# Patient Record
Sex: FEMALE | Race: WHITE | NOT HISPANIC OR LATINO
[De-identification: names, ages, dates, MRNs, and addresses within clinical notes are randomized per-mention and may not be internally consistent; named-entity substitution may affect disease eponyms.]

---

## 2018-07-17 PROBLEM — Z00.00 ENCOUNTER FOR PREVENTIVE HEALTH EXAMINATION: Noted: 2018-07-17

## 2018-07-20 ENCOUNTER — APPOINTMENT (OUTPATIENT)
Dept: PLASTIC SURGERY | Facility: CLINIC | Age: 63
End: 2018-07-20
Payer: SELF-PAY

## 2018-07-20 PROCEDURE — 11950 SUBQ NJX FILLING MATRL 1CC/<: CPT

## 2018-08-21 ENCOUNTER — APPOINTMENT (OUTPATIENT)
Dept: PLASTIC SURGERY | Facility: CLINIC | Age: 63
End: 2018-08-21
Payer: SELF-PAY

## 2018-08-21 PROCEDURE — 11950 SUBQ NJX FILLING MATRL 1CC/<: CPT

## 2019-05-16 PROBLEM — Z00.00 ENCOUNTER FOR PREVENTIVE HEALTH EXAMINATION: Noted: 2019-05-16

## 2019-05-17 ENCOUNTER — APPOINTMENT (OUTPATIENT)
Dept: PLASTIC SURGERY | Facility: CLINIC | Age: 64
End: 2019-05-17
Payer: SELF-PAY

## 2019-05-17 PROCEDURE — 11950 SUBQ NJX FILLING MATRL 1CC/<: CPT

## 2021-03-19 ENCOUNTER — APPOINTMENT (OUTPATIENT)
Age: 66
End: 2021-03-19
Payer: SELF-PAY

## 2021-03-19 PROCEDURE — 11950 SUBQ NJX FILLING MATRL 1CC/<: CPT

## 2021-03-22 PROBLEM — Z00.00 ENCOUNTER FOR PREVENTIVE HEALTH EXAMINATION: Status: ACTIVE | Noted: 2021-03-22

## 2021-04-22 ENCOUNTER — APPOINTMENT (OUTPATIENT)
Dept: PLASTIC SURGERY | Facility: CLINIC | Age: 66
End: 2021-04-22
Payer: SELF-PAY

## 2021-04-22 PROCEDURE — 11950 SUBQ NJX FILLING MATRL 1CC/<: CPT

## 2021-04-26 NOTE — ASSESSMENT
[FreeTextEntry1] : CHRISTIAN RIVAS  was here for touch up revision of prior Botox injection.  She  tolerated the procedure well and will return for next dose in 4-5 months.  Instructions were reviewed.

## 2021-04-26 NOTE — HISTORY OF PRESENT ILLNESS
[FreeTextEntry1] : CHRISTIAN RIVAS is complaining of residual dynamic rhytids of the face and desires more botulinum toxin for temporary reduction in these rhytids.  The risks benefits alternatives limitations and permanent scars were outlined with her . Under aseptic conditions, Botulinum Toxin was administered in the desired area. Please see face sheet for lot , site and dose information. \par \par Please see the scanned face sheet for lot and dose information.\par

## 2021-05-04 NOTE — ASSESSMENT
[FreeTextEntry1] : CHRISTIAN RIVAS  was here for procedural Botox injection.  She  tolerated the procedure well and will return for next dose in 4-5 months.  Instructions were reviewed.

## 2021-05-04 NOTE — PROCEDURE
[FreeTextEntry6] : CHRISTIAN RIVAS is complaining of dynamic rhytids of the face and desires botulinum toxin for temporary reduction in these rhytids.  The risks benefits alternatives limitations and permanent scars were outlined with her . Under aseptic conditions, Botulinum Toxin was administered in the desired area. Please see face sheet for lot , site and dose information. \par \par Please see the scanned face sheet for lot and dose information. Aseptic administration of botox to indicated areas of the face.  See external sheet for lot and dose information

## 2021-06-08 ENCOUNTER — APPOINTMENT (OUTPATIENT)
Dept: PLASTIC SURGERY | Facility: CLINIC | Age: 66
End: 2021-06-08
Payer: SELF-PAY

## 2021-06-08 PROCEDURE — 99212 OFFICE O/P EST SF 10 MIN: CPT

## 2021-06-09 RX ORDER — ALPRAZOLAM 0.25 MG/1
0.25 TABLET ORAL
Qty: 30 | Refills: 0 | Status: ACTIVE | COMMUNITY
Start: 2021-04-29

## 2021-06-09 NOTE — HISTORY OF PRESENT ILLNESS
[FreeTextEntry1] : pt is having refractory rhytids after botox admin and touch up. discussed possible antibody production and discussed alternative treatments if it no longer works  20 u touch up given

## 2021-06-09 NOTE — ASSESSMENT
[FreeTextEntry1] : CHRISTIAN RIVAS  was here for touch up Botox injection.  She  tolerated the procedure well and will return for next dose in 4-5 months.  Instructions were reviewed.\par

## 2021-12-03 ENCOUNTER — APPOINTMENT (OUTPATIENT)
Dept: PLASTIC SURGERY | Facility: CLINIC | Age: 66
End: 2021-12-03
Payer: SELF-PAY

## 2021-12-03 PROCEDURE — 64612 DESTROY NERVE FACE MUSCLE: CPT

## 2022-01-27 ENCOUNTER — APPOINTMENT (OUTPATIENT)
Dept: PLASTIC SURGERY | Facility: CLINIC | Age: 67
End: 2022-01-27
Payer: SELF-PAY

## 2022-01-27 PROCEDURE — 11950 SUBQ NJX FILLING MATRL 1CC/<: CPT

## 2022-01-28 RX ORDER — HYDROCORTISONE 25 MG/G
2.5 CREAM TOPICAL
Qty: 30 | Refills: 0 | Status: ACTIVE | COMMUNITY
Start: 2022-01-12

## 2022-01-28 NOTE — ASSESSMENT
[FreeTextEntry1] : CHRISTIAN RIVAS  was here for procedural voluma injection.  She  tolerated the procedure well and will return for next dose in 8-12 months.  Instructions were reviewed.

## 2022-01-28 NOTE — PROCEDURE
[FreeTextEntry6] : Aseptic administration of voluma to indicated areas of the face.  See external sheet for lot and dose information \par CHRISTIAN RIVAS is complaining of atrophy  of the face and desires voluma for temporary reduction in these rhytids.  The risks benefits alternatives limitations and permanent scars were outlined with her . Under aseptic conditions, voluma  was administered in the desired area. Please see face sheet for lot , site and dose information. \par \par Please see the scanned face sheet for lot and dose information.

## 2023-12-19 ENCOUNTER — APPOINTMENT (OUTPATIENT)
Dept: PLASTIC SURGERY | Facility: CLINIC | Age: 68
End: 2023-12-19
Payer: SELF-PAY

## 2023-12-19 DIAGNOSIS — L98.8 OTHER SPECIFIED DISORDERS OF THE SKIN AND SUBCUTANEOUS TISSUE: ICD-10-CM

## 2023-12-19 PROCEDURE — 64612 DESTROY NERVE FACE MUSCLE: CPT

## 2023-12-19 NOTE — PROCEDURE
[FreeTextEntry6] : CHRISTIAN RIVAS is complaining of dynamic rhytids of the face and desires botulinum toxin for temporary reduction in these rhytids.  The risks benefits alternatives limitations and permanent scars were outlined with her . Under aseptic conditions, Botulinum Toxin was administered in the desired area. Please see face sheet for lot , site and dose information.   Please see the scanned face sheet for lot and dose information. Aseptic administration of botox to indicated areas of the face.  See external sheet for lot and dose information

## 2024-05-24 ENCOUNTER — TRANSCRIPTION ENCOUNTER (OUTPATIENT)
Age: 69
End: 2024-05-24

## 2024-11-08 ENCOUNTER — APPOINTMENT (OUTPATIENT)
Dept: PLASTIC SURGERY | Facility: CLINIC | Age: 69
End: 2024-11-08

## 2024-11-08 PROCEDURE — 64612 DESTROY NERVE FACE MUSCLE: CPT

## 2024-12-11 ENCOUNTER — APPOINTMENT (OUTPATIENT)
Dept: PLASTIC SURGERY | Facility: CLINIC | Age: 69
End: 2024-12-11
Payer: SELF-PAY

## 2024-12-11 DIAGNOSIS — L98.8 OTHER SPECIFIED DISORDERS OF THE SKIN AND SUBCUTANEOUS TISSUE: ICD-10-CM

## 2024-12-11 PROCEDURE — 99024 POSTOP FOLLOW-UP VISIT: CPT

## 2025-05-21 ENCOUNTER — NON-APPOINTMENT (OUTPATIENT)
Age: 70
End: 2025-05-21

## 2025-05-22 ENCOUNTER — APPOINTMENT (OUTPATIENT)
Dept: PLASTIC SURGERY | Facility: CLINIC | Age: 70
End: 2025-05-22
Payer: SELF-PAY

## 2025-05-22 DIAGNOSIS — L98.8 OTHER SPECIFIED DISORDERS OF THE SKIN AND SUBCUTANEOUS TISSUE: ICD-10-CM

## 2025-05-22 PROCEDURE — 64612 DESTROY NERVE FACE MUSCLE: CPT
